# Patient Record
Sex: FEMALE | Race: WHITE
[De-identification: names, ages, dates, MRNs, and addresses within clinical notes are randomized per-mention and may not be internally consistent; named-entity substitution may affect disease eponyms.]

---

## 2018-04-04 ENCOUNTER — HOSPITAL ENCOUNTER (EMERGENCY)
Dept: HOSPITAL 80 - FED | Age: 58
Discharge: HOME | End: 2018-04-04
Payer: COMMERCIAL

## 2018-04-04 VITALS
DIASTOLIC BLOOD PRESSURE: 70 MMHG | SYSTOLIC BLOOD PRESSURE: 112 MMHG | HEART RATE: 95 BPM | OXYGEN SATURATION: 93 % | RESPIRATION RATE: 18 BRPM

## 2018-04-04 VITALS — TEMPERATURE: 97.9 F

## 2018-04-04 DIAGNOSIS — F10.921: ICD-10-CM

## 2018-04-04 DIAGNOSIS — Z85.828: ICD-10-CM

## 2018-04-04 DIAGNOSIS — R11.2: ICD-10-CM

## 2018-04-04 DIAGNOSIS — R40.0: Primary | ICD-10-CM

## 2018-04-04 LAB
PLATELET # BLD: (no result) 10^3/UL (ref 150–400)
PLATELET # BLD: 216 10^3/UL (ref 150–400)

## 2018-04-04 PROCEDURE — G0480 DRUG TEST DEF 1-7 CLASSES: HCPCS

## 2018-04-04 NOTE — EDPHY
H & P


Stated Complaint: Altered mental status, vomiting


Time Seen by Provider: 04/04/18 05:27


HPI/ROS: 





HPI


The patient presents with nausea, vomiting, altered mental status.  She awoke 

this morning at 4:30 a.m. And began to vomit.  She then felt very lethargic and 

tired.  She had a difficult time walking because her body felt heavy.  She had 

a hard time keeping her eyes open.  She said she did not recall the events that 

happened last night.  911 was called and the patient is brought in by ambulance.





Here, she says she is just feeling tired and like she has been drugged.  

According to her family, last night they went out for dinner.  She drink about 

2 glasses of wine.  She did not use any drugs or marijuana.  She is not on any 

new medications.  She does not have any medical problems except for a skin 

cancer on her nose.  She takes a supplement, though has been taking it for 

years.





She is brought in by paramedics, glucose is normal.  They report that she was 

able to walk with a steady gait though it took some effort.





REVIEW OF SYSTEMS


Constitutional:  No fever, no chills.


Eyes:  No discharge.


ENT:  No sore throat.


Cardiovascular:  No chest pain, no palpitations.


Respiratory:  No cough, no shortness of breath.


Gastrointestinal:  No abdominal pain, no vomiting.


Genitourinary:  No hematuria.


Musculoskeletal:  No back pain.


Skin:  No rashes.


Neurological:  No headache.





PMHx:  Skin cancer of her nose





Soc Hx:  Lives at home with her family, occasional alcohol use








PHYSICAL


General Appearance:  Tired appearing, eyes closed


Eyes:  Pupils 5-6 cm and reactive, conjunctivae are noninjected


ENT, Mouth: Mucous membranes dry


Respiratory: There are no retractions, lungs are clear to auscultation


Cardiovascular:  Regular rate and rhythm 


Gastrointestinal:  Abdomen is soft and non-tender, no masses, bowel sounds 

normal 


Neurological:  Tired, though rouses enough to provide history, oriented to have 

person place and date, cranial nerves 2-12 intact, 5/5 strength in upper and 

lower extremities, normal finger to nose testing


Skin:  Warm and dry, no rashes


Musculoskeletal: Neck is supple non tender 


Extremities:  symmetrical, full range of motion 


Psychiatric:  Patient is oriented X 3, there is no agitation 





Source: Patient


Exam Limitations: No limitations


Constitutional: 


 Initial Vital Signs











Temperature (C)  36.6 C   04/04/18 05:15


 


Heart Rate  73   04/04/18 05:15


 


Respiratory Rate  16   04/04/18 05:15


 


Blood Pressure  124/67 H  04/04/18 05:15


 


O2 Sat (%)  89 L  04/04/18 05:15








 











O2 Delivery Mode               Room Air


 


O2 (L/minute)                  2














Allergies/Adverse Reactions: 


 





No Known Allergies Allergy (Unverified 04/04/18 05:34)


 








Home Medications: 














 Medication  Instructions  Recorded


 


NK [No Known Home Meds]  04/04/18














Medical Decision Making





- Diagnostics


Imaging Results: 


CT head shows no acute injuries, discussed with Dr. Ramirez of Radiology.


Imaging: Discussed imaging studies w/ On call Radiologist


Differential Diagnosis: 





This is a 57-year-old female with no real significant past medical history who 

presents brought in by ambulance for altered mental status upon awakening at 4:

30 a.m. This morning when she began vomiting and then was noted by her family 

to be lethargic and somewhat confused, generally very sedate.  Paramedics 

report that she has been very sleepy, though able to walk.  Glucose normal.





Differential diagnosis includes alcohol intoxication, marijuana intoxication, 

CVA, intracranial hemorrhage, dehydration, uremia.





He met the paramedics at the bedside to obtain their report.  In the emergency 

department, IV line was started.  The patient was given 1 L of normal saline.  

CT scan of her head was performed and was unremarkable for any acute injury or 

bleed.  Labs did reveal elevated alcohol level, otherwise unremarkable.  I 

spoke with the patient and her family about this.  They do think it is quite 

unusual that her alcohol as this elevated.  However alcohol intoxication can 

explain all of her symptoms at this point.  I plan to discharge her once she is 

able to walk with a steady gait.





- Data Points


Laboratory Results: 


 Laboratory Results





 04/04/18 05:30 





 04/04/18 05:20 








Medications Given: 


 








Discontinued Medications





Sodium Chloride (Ns)  1,000 mls @ 0 mls/hr IV ONCE ONE


   PRN Reason: Wide Open


   Stop: 04/04/18 05:44


   Last Admin: 04/04/18 05:44 Dose:  1,000 mls








Departure





- Departure


Disposition: Home, Routine, Self-Care


Clinical Impression: 


Altered mental state


Qualifiers:


 Altered mental status type: somnolence Qualified Code(s): R40.0 - Somnolence





Nausea & vomiting


Qualifiers:


 Vomiting type: unspecified Vomiting Intractability: non-intractable Qualified 

Code(s): R11.2 - Nausea with vomiting, unspecified





Alcohol intoxication


Qualifiers:


 Complication of substance-induced condition: with delirium Qualified Code(s): 

F10.921 - Alcohol use, unspecified with intoxication delirium





Condition: Good


Instructions:  Alcohol Intoxication (ED)


Additional Instructions: 


Please make sure to drink plenty of fluids.  You can return to the emergency 

department if your worse in any way.


Referrals: 


Patient,NotPresent [Unknown] - As per Instructions